# Patient Record
Sex: FEMALE | Race: WHITE | NOT HISPANIC OR LATINO
[De-identification: names, ages, dates, MRNs, and addresses within clinical notes are randomized per-mention and may not be internally consistent; named-entity substitution may affect disease eponyms.]

---

## 2018-01-23 ENCOUNTER — APPOINTMENT (OUTPATIENT)
Dept: OBGYN | Facility: CLINIC | Age: 58
End: 2018-01-23

## 2018-07-02 ENCOUNTER — APPOINTMENT (OUTPATIENT)
Dept: OBGYN | Facility: CLINIC | Age: 58
End: 2018-07-02
Payer: COMMERCIAL

## 2018-07-02 VITALS
WEIGHT: 206 LBS | HEART RATE: 97 BPM | SYSTOLIC BLOOD PRESSURE: 99 MMHG | BODY MASS INDEX: 36.5 KG/M2 | DIASTOLIC BLOOD PRESSURE: 66 MMHG | HEIGHT: 63 IN

## 2018-07-02 PROCEDURE — 99213 OFFICE O/P EST LOW 20 MIN: CPT | Mod: 25

## 2018-07-02 PROCEDURE — 99396 PREV VISIT EST AGE 40-64: CPT

## 2018-07-31 ENCOUNTER — LABORATORY RESULT (OUTPATIENT)
Age: 58
End: 2018-07-31

## 2018-07-31 ENCOUNTER — APPOINTMENT (OUTPATIENT)
Dept: OBGYN | Facility: CLINIC | Age: 58
End: 2018-07-31
Payer: COMMERCIAL

## 2018-07-31 VITALS — SYSTOLIC BLOOD PRESSURE: 130 MMHG | DIASTOLIC BLOOD PRESSURE: 80 MMHG | HEIGHT: 63 IN

## 2018-07-31 LAB
CYTOLOGY CVX/VAG DOC THIN PREP: NORMAL
HPV HIGH+LOW RISK DNA PNL CVX: NOT DETECTED

## 2018-07-31 PROCEDURE — 76830 TRANSVAGINAL US NON-OB: CPT | Mod: 59

## 2018-07-31 PROCEDURE — 11200 RMVL SKIN TAGS UP TO&INC 15: CPT

## 2018-07-31 PROCEDURE — 99213 OFFICE O/P EST LOW 20 MIN: CPT | Mod: 25

## 2018-07-31 PROCEDURE — 76857 US EXAM PELVIC LIMITED: CPT | Mod: 59

## 2018-08-09 LAB — CORE LAB BIOPSY: NORMAL

## 2021-08-24 ENCOUNTER — APPOINTMENT (OUTPATIENT)
Dept: OBGYN | Facility: CLINIC | Age: 61
End: 2021-08-24
Payer: COMMERCIAL

## 2021-08-24 ENCOUNTER — NON-APPOINTMENT (OUTPATIENT)
Age: 61
End: 2021-08-24

## 2021-08-24 VITALS
DIASTOLIC BLOOD PRESSURE: 67 MMHG | SYSTOLIC BLOOD PRESSURE: 107 MMHG | WEIGHT: 211 LBS | BODY MASS INDEX: 37.39 KG/M2 | HEIGHT: 63 IN

## 2021-08-24 PROCEDURE — 99386 PREV VISIT NEW AGE 40-64: CPT

## 2021-08-24 PROCEDURE — 99213 OFFICE O/P EST LOW 20 MIN: CPT | Mod: 25

## 2021-08-24 NOTE — HISTORY OF PRESENT ILLNESS
[FreeTextEntry1] : 61 yo p2 here for annual exam, last here over 3 yrs ago. Co pain at left labia minora base, where she notes a recurring cut, also sometimes one on perineum that causes dyspareunia. pt has fibromyalgia, having workup for ra and other autoimmune diseases. reports the vulvar pain has been intermittentl present for over 10 yrs, concerned it may be lichen sclerosis, never biopsied. had other vulvar bxs showed skin tags and condyloma

## 2021-08-24 NOTE — PHYSICAL EXAM
[Appropriately responsive] : appropriately responsive [Alert] : alert [No Acute Distress] : no acute distress [No Lymphadenopathy] : no lymphadenopathy [Regular Rate Rhythm] : regular rate rhythm [No Murmurs] : no murmurs [Clear to Auscultation B/L] : clear to auscultation bilaterally [Soft] : soft [Non-tender] : non-tender [Non-distended] : non-distended [No HSM] : No HSM [No Lesions] : no lesions [No Mass] : no mass [Oriented x3] : oriented x3 [Examination Of The Breasts] : a normal appearance [No Masses] : no breast masses were palpable [Labia Majora] : normal [Labia Minora] : normal [Atrophy] : atrophy [Rectocele] : a rectocele [Normal] : normal [Uterine Adnexae] : normal [No Tenderness] : no tenderness [Nl Sphincter Tone] : normal sphincter tone [FreeTextEntry9] : marika [FreeTextEntry2] : small labia minoras, loss of clitoral detail.

## 2021-08-24 NOTE — PHYSICAL EXAM
[Appropriately responsive] : appropriately responsive [Alert] : alert [No Acute Distress] : no acute distress [No Lymphadenopathy] : no lymphadenopathy [Regular Rate Rhythm] : regular rate rhythm [No Murmurs] : no murmurs [Clear to Auscultation B/L] : clear to auscultation bilaterally [Soft] : soft [Non-tender] : non-tender [Non-distended] : non-distended [No HSM] : No HSM [No Lesions] : no lesions [No Mass] : no mass [Oriented x3] : oriented x3 [Examination Of The Breasts] : a normal appearance [No Masses] : no breast masses were palpable [Labia Majora] : normal [Labia Minora] : normal [Atrophy] : atrophy [Rectocele] : a rectocele [Normal] : normal [Uterine Adnexae] : normal [No Tenderness] : no tenderness [Nl Sphincter Tone] : normal sphincter tone [FreeTextEntry2] : small labia minoras, loss of clitoral detail. [FreeTextEntry9] : marika

## 2021-08-24 NOTE — DISCUSSION/SUMMARY
[FreeTextEntry1] : annual exam. pap/hpv done/.\par recent nl mammo. having dexa w rheum . \par vulvar pain and irritation, cultures sent. rto for vulvar biopsy.

## 2021-08-24 NOTE — HISTORY OF PRESENT ILLNESS
[FreeTextEntry1] : 59 yo p2 here for annual exam, last here over 3 yrs ago. Co pain at left labia minora base, where she notes a recurring cut, also sometimes one on perineum that causes dyspareunia. pt has fibromyalgia, having workup for ra and other autoimmune diseases. reports the vulvar pain has been intermittentl present for over 10 yrs, concerned it may be lichen sclerosis, never biopsied. had other vulvar bxs showed skin tags and condyloma

## 2021-08-28 LAB
BACTERIA GENITAL AEROBE CULT: NORMAL
HPV HIGH+LOW RISK DNA PNL CVX: NOT DETECTED

## 2021-08-31 LAB — CYTOLOGY CVX/VAG DOC THIN PREP: ABNORMAL

## 2021-09-16 ENCOUNTER — APPOINTMENT (OUTPATIENT)
Dept: OBGYN | Facility: CLINIC | Age: 61
End: 2021-09-16
Payer: COMMERCIAL

## 2021-09-16 VITALS
DIASTOLIC BLOOD PRESSURE: 74 MMHG | HEIGHT: 63 IN | BODY MASS INDEX: 37.21 KG/M2 | WEIGHT: 210 LBS | SYSTOLIC BLOOD PRESSURE: 110 MMHG

## 2021-09-16 PROCEDURE — 99214 OFFICE O/P EST MOD 30 MIN: CPT | Mod: 25

## 2021-09-16 PROCEDURE — 56605 BIOPSY OF VULVA/PERINEUM: CPT

## 2021-09-16 RX ORDER — LIDOCAINE HYDROCHLORIDE 30 MG/G
3 CREAM TOPICAL
Qty: 1 | Refills: 1 | Status: ACTIVE | COMMUNITY
Start: 2021-09-16 | End: 1900-01-01

## 2021-09-16 NOTE — DISCUSSION/SUMMARY
[FreeTextEntry1] : urethral caruncle noted- estrogen cream to area nightly.\par vulvar pain, poss vulvodynia, diminished lab minora sugg lichen sclerosiis.\par gets some relief w clobetasol, prescribed. \par lidocaine cream prescribed. 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

## 2021-09-16 NOTE — PHYSICAL EXAM
[Labia Majora] : normal [Labia Minora] : normal [Ulcer ___ mm] : [unfilled] ~Umm ulcer [Urethral Caruncle] : urethral caruncle [Rectocele] : a rectocele [Normal] : normal [Uterine Adnexae] : normal [FreeTextEntry1] : diminished labia minoras bilaterally

## 2021-09-16 NOTE — PROCEDURE
[Vulvar Biopsy] : Vulvar Biopsy [Time out performed] : Pre-procedure time out performed.  Patient's name, date of birth and procedure confirmed. [Consent Obtained] : Consent obtained [] : on the right labia minora [Size of Biopsy Taken: ___ (mm)] : [unfilled]Umm [Local Anesthesia] : local anesthesia [____ Lidocaine w/Epi] : ~VmL  lidocaine with epinephrine [Betadine] : Betadine [Sent to Pathology] : placed in buffered formalin and sent for pathology [Punch] : punch biopsy [Silver Nitrate] : silver nitrate [Tolerated Well] : the patient tolerated the procedure well [No Complications] : there were no complications

## 2021-09-27 ENCOUNTER — APPOINTMENT (OUTPATIENT)
Dept: OBGYN | Facility: CLINIC | Age: 61
End: 2021-09-27

## 2021-10-06 LAB — CORE LAB BIOPSY: NORMAL

## 2021-10-14 ENCOUNTER — APPOINTMENT (OUTPATIENT)
Dept: OBGYN | Facility: CLINIC | Age: 61
End: 2021-10-14
Payer: COMMERCIAL

## 2021-10-14 VITALS
BODY MASS INDEX: 37.03 KG/M2 | DIASTOLIC BLOOD PRESSURE: 67 MMHG | HEIGHT: 63 IN | SYSTOLIC BLOOD PRESSURE: 105 MMHG | WEIGHT: 209 LBS

## 2021-10-14 DIAGNOSIS — N76.3 SUBACUTE AND CHRONIC VULVITIS: ICD-10-CM

## 2021-10-14 DIAGNOSIS — N36.2 URETHRAL CARUNCLE: ICD-10-CM

## 2021-10-14 PROCEDURE — 56605 BIOPSY OF VULVA/PERINEUM: CPT

## 2021-10-14 PROCEDURE — 56606 BIOPSY OF VULVA/PERINEUM: CPT

## 2021-10-14 RX ORDER — ESTRADIOL 0.1 MG/G
0.1 CREAM VAGINAL
Qty: 1 | Refills: 2 | Status: ACTIVE | COMMUNITY
Start: 2021-10-14 | End: 1900-01-01

## 2021-10-14 NOTE — HISTORY OF PRESENT ILLNESS
[FreeTextEntry1] : pt here to fu biopsy of vulvar lesion that recurrently got cut, causing dyspareunia. path showed lichenoid change with eosinophils, no lichen sclerosis. cw drug eruption or contact drmatitis. pt also concerned re textured bumps bilaterally, has hx both warts and skintags.

## 2021-10-14 NOTE — PROCEDURE
[Vulvar Biopsy] : Vulvar Biopsy [] : on the left labia majora [Size of Biopsy Taken: ___ (mm)] : [unfilled]Umm [Local Anesthesia] : local anesthesia [____ Lidocaine w/o Epi] : ~VmL lidocaine without epinephrine [Sent to Pathology] : placed in buffered formalin and sent for pathology [Punch] : punch biopsy [Silver Nitrate] : silver nitrate [de-identified] : x3

## 2021-10-14 NOTE — DISCUSSION/SUMMARY
[FreeTextEntry1] : painful spot cw lichenoid reaction, cont topical e2, stop using  otc topical agents on the area to see if topical dermatis is cause. \par 3 vulvar biopsies done of pigmented vulvar lesions that are bothering patient. fu 2-3 weeks .

## 2021-10-14 NOTE — PHYSICAL EXAM
[Vulvar Atrophy] : vulvar atrophy [Brown] : brown [Irregular grouping] : irregularly grouped [Sharply Demarcated Borders] : with sharply demarcated borders [From ___ cm] : from [unfilled] ~Ucm [To ___ cm] : to [unfilled] ~m [FreeTextEntry2] : biopsy site healing well, non tender. brown oval textured plaques noted on r and 2 on left

## 2021-10-27 LAB — CORE LAB BIOPSY: NORMAL

## 2021-11-02 ENCOUNTER — APPOINTMENT (OUTPATIENT)
Dept: OBGYN | Facility: CLINIC | Age: 61
End: 2021-11-02
Payer: COMMERCIAL

## 2021-11-02 VITALS
WEIGHT: 209 LBS | BODY MASS INDEX: 37.03 KG/M2 | HEIGHT: 63 IN | DIASTOLIC BLOOD PRESSURE: 80 MMHG | SYSTOLIC BLOOD PRESSURE: 116 MMHG

## 2021-11-02 DIAGNOSIS — N90.89 OTHER SPECIFIED NONINFLAMMATORY DISORDERS OF VULVA AND PERINEUM: ICD-10-CM

## 2021-11-02 PROCEDURE — 99213 OFFICE O/P EST LOW 20 MIN: CPT

## 2021-11-03 NOTE — PHYSICAL EXAM
[Labia Majora] : normal [Labia Minora] : normal [Normal] : normal [Uterine Adnexae] : normal [FreeTextEntry1] : biopsy sites healing well [FreeTextEntry2] : nontender to palpation

## 2021-11-03 NOTE — DISCUSSION/SUMMARY
[FreeTextEntry1] : vulvar bxs benign, healing well. \par poss vulvodynia. continue topical estrogen, topical anesthetics, avoid irritants.

## 2021-11-03 NOTE — HISTORY OF PRESENT ILLNESS
[FreeTextEntry1] : 60 yo pt here for fu to vulvar biopsies. Path showed inclusion cyst, skin tag, and inflammed  skin. Reports episode of clitoral pain during sexual activity. Reports topical tx prescribed last visits helping.

## 2022-05-07 DIAGNOSIS — Q87.19 OTHER CONGEN MALFORMATION SYNDROM: ICD-10-CM

## 2022-05-07 DIAGNOSIS — Z87.19 PERSONAL HISTORY OF OTHER DISEASES OF THE DIGESTIVE SYSTEM: ICD-10-CM

## 2022-05-09 ENCOUNTER — APPOINTMENT (OUTPATIENT)
Dept: ORTHOPEDIC SURGERY | Facility: CLINIC | Age: 62
End: 2022-05-09
Payer: COMMERCIAL

## 2022-05-09 VITALS — BODY MASS INDEX: 36.32 KG/M2 | HEIGHT: 63 IN | WEIGHT: 205 LBS

## 2022-05-09 PROCEDURE — 73562 X-RAY EXAM OF KNEE 3: CPT | Mod: 50

## 2022-05-09 PROCEDURE — 20610 DRAIN/INJ JOINT/BURSA W/O US: CPT | Mod: LT

## 2022-05-09 PROCEDURE — 99213 OFFICE O/P EST LOW 20 MIN: CPT | Mod: 25

## 2022-05-09 NOTE — ASSESSMENT
[FreeTextEntry1] : 62 yo female presenting for f/y of bilateral knee oa. Patient had bilateral knee intraartricular CSI on 9/16/22 with good relief of pain, patient reports left knee more painful than right, requesting repeat CSI today\par -Discussed with patient that she is indicated for bilateral TKAs, L>R.\par -Repeat CSI given today, tolerated well\par -Activities as tolerated\par -Rest, ice, NSAIDs PRN for pain\par -All questions answered\par -F/u PRN\par

## 2022-05-09 NOTE — PHYSICAL EXAM
[NL (0)] : extension 0 degrees [5___] : hamstring 5[unfilled]/5 [] : patient ambulates without assistive device [Bilateral] : knee bilaterally [AP] : anteroposterior [Lateral] : lateral [[place]] : skyline [advanced tricompartmental OA with medial compartment narrowing and varus alignment] : advanced tricompartmental OA with medial compartment narrowing and varus alignment [FreeTextEntry8] : distal hamstring tendons ttp [FreeTextEntry9] : Left knee severe bone on bone djd, right knee moderate to severe with medial joint space narrowing [TWNoteComboBox7] : flexion 125 degrees

## 2022-05-09 NOTE — PROCEDURE
[Large Joint Injection] : Large joint injection [Bilateral] : bilaterally of the [Knee] : knee [Pain] : pain [Inflammation] : inflammation [Alcohol] : alcohol [Sterile technique used] : sterile technique used [___ cc    3mg] :  Betamethasone (Celestone) ~Vcc of 3mg [___ cc    1%] : Lidocaine ~Vcc of 1%  [Call if redness, pain or fever occur] : call if redness, pain or fever occur [Apply ice for 15min out of every hour for the next 12-24 hours as tolerated] : apply ice for 15 minutes out of every hour for the next 12-24 hours as tolerated [Previous OTC use and PT nontherapeutic] : patient has tried OTC's including aspirin, Ibuprofen, Aleve, etc or prescription NSAIDS, and/or exercises at home and/or physical therapy without satisfactory response [Patient had decreased mobility in the joint] : patient had decreased mobility in the joint [Risks, benefits, alternatives discussed / Verbal consent obtained] : the risks benefits, and alternatives have been discussed, and verbal consent was obtained

## 2022-05-09 NOTE — HISTORY OF PRESENT ILLNESS
[Gradual] : gradual [10] : 10 [Dull/Aching] : dull/aching [Stabbing] : stabbing [Throbbing] : throbbing [Intermittent] : intermittent [Household chores] : household chores [Leisure] : leisure [Sleep] : sleep [Social interactions] : social interactions [Rest] : rest [Part time] : Work status: part time [de-identified] : Patient is here for a follow up. Patient is being treated for bilateral moderate to severe knee oa. Patient had bilat cortisone injections on 9/16/2021. Patient states left knee is worse than right. Patient would like bilat cortisone injections today.  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

## 2022-06-22 DIAGNOSIS — M17.12 UNILATERAL PRIMARY OSTEOARTHRITIS, LEFT KNEE: ICD-10-CM

## 2022-06-23 ENCOUNTER — APPOINTMENT (OUTPATIENT)
Dept: ORTHOPEDIC SURGERY | Facility: CLINIC | Age: 62
End: 2022-06-23

## 2023-02-02 ENCOUNTER — APPOINTMENT (OUTPATIENT)
Dept: ORTHOPEDIC SURGERY | Facility: CLINIC | Age: 63
End: 2023-02-02
Payer: COMMERCIAL

## 2023-02-02 PROCEDURE — 20610 DRAIN/INJ JOINT/BURSA W/O US: CPT | Mod: NC

## 2023-02-02 PROCEDURE — 99213 OFFICE O/P EST LOW 20 MIN: CPT | Mod: 25

## 2023-02-02 NOTE — HISTORY OF PRESENT ILLNESS
[Gradual] : gradual [Dull/Aching] : dull/aching [Stabbing] : stabbing [Throbbing] : throbbing [Intermittent] : intermittent [Household chores] : household chores [Leisure] : leisure [Sleep] : sleep [Social interactions] : social interactions [Rest] : rest [Part time] : Work status: part time [8] : 8 [2] : 2 [de-identified] : Patient is here for a follow up. Patient is being treated for bilateral moderate to severe knee oa. Patient had bilat cortisone injections on 9/16/2021 and 5/9/2022. Patient states the injections helped until 1 week ago. Patient states left knee is worse than right. Patient would like bilat cortisone injections today.  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

## 2023-02-02 NOTE — PHYSICAL EXAM
[NL (0)] : extension 0 degrees [5___] : hamstring 5[unfilled]/5 [Bilateral] : knee bilaterally [AP] : anteroposterior [Lateral] : lateral [[place]] : skyline [advanced tricompartmental OA with medial compartment narrowing and varus alignment] : advanced tricompartmental OA with medial compartment narrowing and varus alignment [] : non-antalgic [FreeTextEntry8] : distal hamstring tendons ttp [FreeTextEntry9] : Left knee severe bone on bone djd, right knee moderate to severe with medial joint space narrowing [TWNoteComboBox7] : flexion 125 degrees

## 2023-02-02 NOTE — ASSESSMENT
[FreeTextEntry1] : 60 yo female presenting for f/y of bilateral knee oa. Patient had bilateral knee intraartricular CSI on 9/16/22 with good relief of pain, patient reports left knee more painful than right, requesting repeat CSI today\par -Discussed with patient that she is indicated for bilateral TKAs, L>R.\par -Repeat bilateral CSI given today, tolerated well\par -Activities as tolerated\par -Rest, ice, NSAIDs PRN for pain\par -All questions answered\par -F/u PRN\par

## 2023-08-21 ENCOUNTER — APPOINTMENT (OUTPATIENT)
Dept: OBGYN | Facility: CLINIC | Age: 63
End: 2023-08-21
Payer: COMMERCIAL

## 2023-08-21 VITALS
HEIGHT: 63 IN | SYSTOLIC BLOOD PRESSURE: 110 MMHG | BODY MASS INDEX: 37.56 KG/M2 | DIASTOLIC BLOOD PRESSURE: 66 MMHG | WEIGHT: 212 LBS

## 2023-08-21 DIAGNOSIS — Z00.00 ENCOUNTER FOR GENERAL ADULT MEDICAL EXAMINATION W/OUT ABNORMAL FINDINGS: ICD-10-CM

## 2023-08-21 DIAGNOSIS — N95.2 POSTMENOPAUSAL ATROPHIC VAGINITIS: ICD-10-CM

## 2023-08-21 DIAGNOSIS — R10.2 PELVIC AND PERINEAL PAIN: ICD-10-CM

## 2023-08-21 DIAGNOSIS — N94.819 VULVODYNIA, UNSPECIFIED: ICD-10-CM

## 2023-08-21 PROCEDURE — 99214 OFFICE O/P EST MOD 30 MIN: CPT | Mod: 25

## 2023-08-21 PROCEDURE — 99396 PREV VISIT EST AGE 40-64: CPT

## 2023-08-21 RX ORDER — CLOBETASOL PROPIONATE 0.5 MG/G
0.05 CREAM TOPICAL TWICE DAILY
Qty: 1 | Refills: 0 | Status: ACTIVE | COMMUNITY
Start: 2021-09-16 | End: 1900-01-01

## 2023-08-22 NOTE — PLAN
[FreeTextEntry1] : Pelvic pain.  We discussed consideration of a trial of pelvic physical therapy.  The patient is referred to see the [facility] physical therapists for consultation.  She is prescribed clobetasol for lichen sclerosis and chronic vulvitis.  We spent 30 minutes in consultation.

## 2023-08-22 NOTE — PHYSICAL EXAM
[Appropriately responsive] : appropriately responsive [Alert] : alert [No Acute Distress] : no acute distress [No Lymphadenopathy] : no lymphadenopathy [Regular Rate Rhythm] : regular rate rhythm [No Murmurs] : no murmurs [Clear to Auscultation B/L] : clear to auscultation bilaterally [Soft] : soft [Non-tender] : non-tender [Non-distended] : non-distended [No HSM] : No HSM [No Lesions] : no lesions [No Mass] : no mass [Oriented x3] : oriented x3 [Examination Of The Breasts] : a normal appearance [No Masses] : no breast masses were palpable [Labia Majora] : normal [Labia Minora] : normal [Normal] : normal [Uterine Adnexae] : normal [No Tenderness] : no tenderness [FreeTextEntry9] : marika

## 2023-08-22 NOTE — HISTORY OF PRESENT ILLNESS
[FreeTextEntry1] : 63 yo pt here for annual exam.  was getting rf nerve ablations fo painful si joint, insurance stopped covering it.  no gyn co.  on hydroxychloroquin for sjogrens, helping many skin  conditions, including vulvar issues. uses lotrisone locally.  has small rectocoele, feces end up stuck there, annoying, nothing protruding through

## 2023-08-23 LAB
HPV HIGH+LOW RISK DNA PNL CVX: NOT DETECTED
HPV HIGH+LOW RISK DNA PNL CVX: NOT DETECTED

## 2023-08-28 LAB — CYTOLOGY CVX/VAG DOC THIN PREP: ABNORMAL

## 2023-08-31 ENCOUNTER — APPOINTMENT (OUTPATIENT)
Dept: ORTHOPEDIC SURGERY | Facility: CLINIC | Age: 63
End: 2023-08-31
Payer: COMMERCIAL

## 2023-08-31 DIAGNOSIS — M70.50 OTHER BURSITIS OF KNEE, UNSPECIFIED KNEE: ICD-10-CM

## 2023-08-31 PROCEDURE — 73562 X-RAY EXAM OF KNEE 3: CPT | Mod: 50

## 2023-08-31 PROCEDURE — 99214 OFFICE O/P EST MOD 30 MIN: CPT | Mod: 25

## 2023-08-31 PROCEDURE — 20550 NJX 1 TENDON SHEATH/LIGAMENT: CPT | Mod: LT

## 2023-08-31 NOTE — PROCEDURE
[Knee] : knee [Pain] : pain [Inflammation] : inflammation [Alcohol] : alcohol [Sterile technique used] : sterile technique used [___ cc    3mg] :  Betamethasone (Celestone) ~Vcc of 3mg [Call if redness, pain or fever occur] : call if redness, pain or fever occur [Apply ice for 15min out of every hour for the next 12-24 hours as tolerated] : apply ice for 15 minutes out of every hour for the next 12-24 hours as tolerated [Previous OTC use and PT nontherapeutic] : patient has tried OTC's including aspirin, Ibuprofen, Aleve, etc or prescription NSAIDS, and/or exercises at home and/or physical therapy without satisfactory response [Patient had decreased mobility in the joint] : patient had decreased mobility in the joint [Risks, benefits, alternatives discussed / Verbal consent obtained] : the risks benefits, and alternatives have been discussed, and verbal consent was obtained [Tendon Sheath] : tendon sheath [Left] : of the left [Pas anserinus sheath] : pas anserinus sheath

## 2023-08-31 NOTE — ASSESSMENT
[FreeTextEntry1] : 60 yo female presenting with bilateral knee oa presenting today with left knee pes bursitis. Patient had bilateral knee intraartricular CSI on 2/2/23 with good relief of pain.  requesting repeat CSI today for pes bursitis. -Discussed with patient that she is indicated for bilateral TKAs, L>R. -Repeat bilateral CSI given today, tolerated well -Activities as tolerated -Rest, ice, NSAIDs PRN for pain -All questions answered -F/u PRN

## 2023-08-31 NOTE — HISTORY OF PRESENT ILLNESS
[Gradual] : gradual [8] : 8 [2] : 2 [Dull/Aching] : dull/aching [Stabbing] : stabbing [Throbbing] : throbbing [Intermittent] : intermittent [Household chores] : household chores [Leisure] : leisure [Sleep] : sleep [Social interactions] : social interactions [Rest] : rest [Part time] : Work status: part time [de-identified] : Patient is here for a follow up. Patient is being treated for bilateral moderate to severe knee oa. Patient had bilat cortisone injections on 9/16/2021 and 5/9/2022 and right knee CSI on 2/2/2023. Patient states the injections helped until 1 week ago. Patient states left knee is worse than right. Patient would like left knee cortisone injections today.  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

## 2023-08-31 NOTE — PHYSICAL EXAM
[NL (0)] : extension 0 degrees [5___] : hamstring 5[unfilled]/5 [Left] : left knee [] : non-antalgic [FreeTextEntry8] : distal hamstring tendons ttp [FreeTextEntry9] : Left knee severe bone on bone djd, right knee moderate to severe with medial joint space narrowing [TWNoteComboBox7] : flexion 125 degrees

## 2023-10-03 ENCOUNTER — APPOINTMENT (OUTPATIENT)
Dept: ORTHOPEDIC SURGERY | Facility: CLINIC | Age: 63
End: 2023-10-03
Payer: COMMERCIAL

## 2023-10-03 DIAGNOSIS — M17.12 UNILATERAL PRIMARY OSTEOARTHRITIS, LEFT KNEE: ICD-10-CM

## 2023-10-03 PROCEDURE — 99213 OFFICE O/P EST LOW 20 MIN: CPT | Mod: 25

## 2023-10-03 PROCEDURE — 20610 DRAIN/INJ JOINT/BURSA W/O US: CPT | Mod: LT

## 2024-01-18 ENCOUNTER — APPOINTMENT (OUTPATIENT)
Dept: ORTHOPEDIC SURGERY | Facility: CLINIC | Age: 64
End: 2024-01-18
Payer: COMMERCIAL

## 2024-01-18 DIAGNOSIS — M17.0 BILATERAL PRIMARY OSTEOARTHRITIS OF KNEE: ICD-10-CM

## 2024-01-18 PROCEDURE — 20610 DRAIN/INJ JOINT/BURSA W/O US: CPT | Mod: 50

## 2024-01-18 PROCEDURE — 99213 OFFICE O/P EST LOW 20 MIN: CPT | Mod: 25

## 2024-01-18 NOTE — ASSESSMENT
[FreeTextEntry1] : 62 yo female presenting today for f/u of bilateral knee oa. Left knee pain worse than the right today.  Responded well to csi in the past, requesting repeat csi today. -Discussed risks, benefits, alternatives of bilateral knee intraarticular CSI, patient tolerated well -Discussed with patient that she is indicated for bilateral TKAs, L>R, patient expressed understanding -Discussed risks, benefits, alternatives of left knee intraarticular CSI, patient tolerated well -Activities as tolerated -Rest, ice, NSAIDs PRN for pain -All questions answered -F/u PRN  Entered by Obdulio Walsh PA-C acting as scribe. Dr. Saba Attestation The documentation recorded by the scribe, in my presence, accurately reflects the service I, Dr. Saba, personally performed, and the decisions made by me with my edits as appropriate.

## 2024-01-18 NOTE — HISTORY OF PRESENT ILLNESS
[Gradual] : gradual [8] : 8 [2] : 2 [Dull/Aching] : dull/aching [Stabbing] : stabbing [Throbbing] : throbbing [Intermittent] : intermittent [Household chores] : household chores [Leisure] : leisure [Sleep] : sleep [Social interactions] : social interactions [Rest] : rest [Part time] : Work status: part time [de-identified] : Patient is here for a follow up. Patient is being treated for bilateral moderate to severe knee oa. Patient had bilat cortisone injections on 9/16/2021, 5/9/2022 and right knee CSI on 2/2/2023, left knee CSI on 8/31/2023 and 10/3/23. Patient states left knee is worse than right. Patient would like right knee cortisone injections today.  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

## 2024-01-18 NOTE — PHYSICAL EXAM
[Left] : left knee [NL (0)] : extension 0 degrees [5___] : hamstring 5[unfilled]/5 [] : non-antalgic [FreeTextEntry8] : distal hamstring tendons ttp [TWNoteComboBox7] : flexion 125 degrees

## 2024-08-28 ENCOUNTER — APPOINTMENT (OUTPATIENT)
Dept: OBGYN | Facility: CLINIC | Age: 64
End: 2024-08-28

## 2024-11-07 ENCOUNTER — APPOINTMENT (OUTPATIENT)
Dept: ORTHOPEDIC SURGERY | Facility: CLINIC | Age: 64
End: 2024-11-07
Payer: COMMERCIAL

## 2024-11-07 DIAGNOSIS — M17.0 BILATERAL PRIMARY OSTEOARTHRITIS OF KNEE: ICD-10-CM

## 2024-11-07 PROCEDURE — 20610 DRAIN/INJ JOINT/BURSA W/O US: CPT | Mod: 50

## 2025-06-17 ENCOUNTER — APPOINTMENT (OUTPATIENT)
Dept: ORTHOPEDIC SURGERY | Facility: CLINIC | Age: 65
End: 2025-06-17

## 2025-06-17 PROCEDURE — 73562 X-RAY EXAM OF KNEE 3: CPT | Mod: 50

## 2025-06-17 PROCEDURE — 20610 DRAIN/INJ JOINT/BURSA W/O US: CPT | Mod: LT

## 2025-08-15 ENCOUNTER — NON-APPOINTMENT (OUTPATIENT)
Age: 65
End: 2025-08-15

## 2025-09-16 ENCOUNTER — APPOINTMENT (OUTPATIENT)
Dept: ORTHOPEDIC SURGERY | Facility: CLINIC | Age: 65
End: 2025-09-16

## 2025-09-16 DIAGNOSIS — M17.0 BILATERAL PRIMARY OSTEOARTHRITIS OF KNEE: ICD-10-CM

## 2025-09-16 DIAGNOSIS — M17.12 UNILATERAL PRIMARY OSTEOARTHRITIS, LEFT KNEE: ICD-10-CM

## 2025-09-16 DIAGNOSIS — M17.11 UNILATERAL PRIMARY OSTEOARTHRITIS, RIGHT KNEE: ICD-10-CM
